# Patient Record
(demographics unavailable — no encounter records)

---

## 2025-05-22 NOTE — HISTORY OF PRESENT ILLNESS
[8] : 8 [Localized] : localized [Intermittent] : intermittent [Household chores] : household chores [Rest] : rest [Injection therapy] : injection therapy [Walking] : walking [Stairs] : stairs [de-identified] : 02/08/24 follow up bl knees finsihed series of euflexxa in march 2023 with good relief   03/14/2024  follow up here to start   knees orthovisc   03/21/2024- B/L Orthovisc #2.  5.22.25 PATIENT HERE FOR BILATERAL KNEE PAIN. PATIENT IS REQUESTING CORTISONE INJECTION.  [FreeTextEntry1] : bilateral knees

## 2025-05-22 NOTE — ASSESSMENT
[FreeTextEntry1] : 66 year F WITH MODERATE B/L KNEE PAIN, L>R. HAS SEEN DR. GOMEZ IN THE PAST AND TRIED CSI AND GEL INJECTIONS WITH SOME RELIEF. PAIN WORSENS WITH STAIRS AND WALKING PROLONGED DISTANCES. PAIN IS AFFECTING ADL AND FUNCTIONAL ACTIVITIES. XRAYS REVIEWED WITH LT KNEE ADVANCED MEDIAL OA, RT KNEE MILD OA. TREATMENT OPTIONS REVIEWED. QUESTIONS ANSWERED. TKA DISCUSSED, SHE WOULD LIKE TO WAIT UNTIL JULY.   PMHx: DM (A1C: 6.4) NO METAL ALLERGIES NO H/O DVT/PE NO H/O MRSA/VRSA   WE WILL GIVE CORTISONE TODAY. TIMING REVIEWED FU WITH AUTH FOR EUFLEXXA

## 2025-05-22 NOTE — PROCEDURE
[de-identified] : Procedure Name: Large Joint Injection / Aspiration: Depomedrol and Marcaine Anesthesia: ethyl chloride sprayed topically..  Depomedrol: An injection of Depomedrol 80 mg , 1 cc.  Marcaine: 5 cc.  Medication was injected in LEFT knees. Patient has tried OTC's including aspirin, Ibuprofen, Aleve etc or prescriptionNSAIDS, and/or exercises at home and/ or physical therapy without satisfactory response. After verbal consent using sterile preparation and technique. The risks, benefits, and alternatives to cortisone injection were explained in full to the patient. Risks outlined include but are not limited to infection, sepsis, bleeding, scarring, skin discoloration, temporary  increase in pain, syncopal episode, failure to resolve symptoms, allergic reaction, symptom recurrence, and elevation of blood sugar in diabetics. Patient understood the risks. All questions were answered. After discussion of options, patient requested an injection. Oral informed consent was obtained and sterile prep was done of the injection site. Sterile technique was utilized for the procedure including the preparation of the solutions used for the injection. Patient tolerated the procedure well. Advised to ice the injection site this evening. Prep with alcohol locally to site. Sterile technique used. Post Procedure Instructions: Patient was advised to call if redness, pain, or fever occur and apply ice for 15 min. out of every hour for the next 12-24 hours as tolerated.

## 2025-06-12 NOTE — ASSESSMENT
[FreeTextEntry1] : 67 year F WITH MODERATE B/L KNEE PAIN, L>R. HAS SEEN DR. GOMEZ IN THE PAST AND TRIED CSI AND GEL INJECTIONS WITH SOME RELIEF. PAIN WORSENS WITH STAIRS AND WALKING PROLONGED DISTANCES. PAIN IS AFFECTING ADL AND FUNCTIONAL ACTIVITIES. XRAYS REVIEWED WITH LT KNEE ADVANCED MEDIAL OA, RT KNEE MILD OA. TREATMENT OPTIONS REVIEWED. QUESTIONS ANSWERED. TKA DISCUSSED, SHE WOULD LIKE TO WAIT UNTIL JULY.   PMHx: DM (A1C: 6.4) NO METAL ALLERGIES NO H/O DVT/PE NO H/O MRSA/VRSA   B/L KNEE EUFLEXXA #2 TODAY. PATIENT TOLERATED INJECTION WELL.

## 2025-06-12 NOTE — HISTORY OF PRESENT ILLNESS
[8] : 8 [Localized] : localized [Intermittent] : intermittent [Household chores] : household chores [Rest] : rest [Injection therapy] : injection therapy [Walking] : walking [Stairs] : stairs [de-identified] : 02/08/24 follow up bl knees finsihed series of euflexxa in march 2023 with good relief   03/14/2024  follow up here to start  bl knees orthovisc   03/21/2024- B/L Orthovisc #2.  5.22.25 PATIENT HERE FOR BILATERAL KNEE PAIN. PATIENT IS REQUESTING CORTISONE INJECTION.   6.05.25: PATIENT HERE FOR BILATERAL KNEE PAIN. EUFLEXXA INJ #1 TODAY, BILATERAL KNEES.  6.12.25 PATIENT HERE FOR BILATERAL KNEE PAIN. EUFLEXXA INJ #2 TODAY, BILATERAL KNEES.  [FreeTextEntry1] : bilateral knees

## 2025-06-12 NOTE — PROCEDURE
[de-identified] : Procedure Name: Euflexxa (Large Joint)  Viscosupplementation Injection: X-ray evidence of Osteoarthritis on this or prior visit, Patient has tried OTC's including aspirin, Ibuprofen, Aleve etc or prescription NSAIDS, and/or exercises at home and/ or physical therapy without satisfactory response and Repeat series performed because patient had significant improvement in their pain and functional capacity from prior series which was given more than six months ago.   An injection of Euflexxa 2ml #2 was injected into the bilateral knee(s). after verbal consent using sterile technique. The risks, benefits, and alternatives to Viscosupplementation injection were explained in full to the patient. Risks outlined include but are not limited to infection, sepsis, bleeding, scarring, skin discoloration, temporary increase in pain, syncopal episode, failure to resolve symptoms, allergic reaction, and symptom recurrence. Signs and symptoms of infection reviewed and patient advised to call immediately for redness, fevers, and/or chills. Patient understood the risks. All questions were answered. After discussion of options, patient requested Viscosupplementation. The patient tolerated the procedure well. Ice tonight to the injection site.

## 2025-06-19 NOTE — HISTORY OF PRESENT ILLNESS
[8] : 8 [Localized] : localized [Intermittent] : intermittent [Household chores] : household chores [Rest] : rest [Injection therapy] : injection therapy [Walking] : walking [Stairs] : stairs [de-identified] : 02/08/24 follow up bl knees finsihed series of euflexxa in march 2023 with good relief   03/14/2024  follow up here to start  bl knees orthovisc   03/21/2024- B/L Orthovisc #2.  5.22.25 PATIENT HERE FOR BILATERAL KNEE PAIN. PATIENT IS REQUESTING CORTISONE INJECTION.   6.05.25: PATIENT HERE FOR BILATERAL KNEE PAIN. EUFLEXXA INJ #1 TODAY, BILATERAL KNEES.  6.12.25 PATIENT HERE FOR BILATERAL KNEE PAIN. EUFLEXXA INJ #2 TODAY, BILATERAL KNEES.   6.19.25: PATIENT HERE FOR BILATERAL KNEE PAIN. EUFLEXXA INJ #3 TODAY, BILATERAL KNEES.  [FreeTextEntry1] : bilateral knees

## 2025-06-19 NOTE — PROCEDURE
[de-identified] : Procedure Name: Euflexxa (Large Joint)  Viscosupplementation Injection: X-ray evidence of Osteoarthritis on this or prior visit, Patient has tried OTC's including aspirin, Ibuprofen, Aleve etc or prescription NSAIDS, and/or exercises at home and/ or physical therapy without satisfactory response and Repeat series performed because patient had significant improvement in their pain and functional capacity from prior series which was given more than six months ago.   An injection of Euflexxa 2ml #3 was injected into the bilateral knee(s). after verbal consent using sterile technique. The risks, benefits, and alternatives to Viscosupplementation injection were explained in full to the patient. Risks outlined include but are not limited to infection, sepsis, bleeding, scarring, skin discoloration, temporary increase in pain, syncopal episode, failure to resolve symptoms, allergic reaction, and symptom recurrence. Signs and symptoms of infection reviewed and patient advised to call immediately for redness, fevers, and/or chills. Patient understood the risks. All questions were answered. After discussion of options, patient requested Viscosupplementation. The patient tolerated the procedure well. Ice tonight to the injection site.

## 2025-06-19 NOTE — ASSESSMENT
[FreeTextEntry1] : 67 year F WITH MODERATE B/L KNEE PAIN, L>R. HAS SEEN DR. GOMEZ IN THE PAST AND TRIED CSI AND GEL INJECTIONS WITH SOME RELIEF. PAIN WORSENS WITH STAIRS AND WALKING PROLONGED DISTANCES. PAIN IS AFFECTING ADL AND FUNCTIONAL ACTIVITIES. XRAYS REVIEWED WITH LT KNEE ADVANCED MEDIAL OA, RT KNEE MILD OA. TREATMENT OPTIONS REVIEWED. QUESTIONS ANSWERED. TKA DISCUSSED, SHE WOULD LIKE TO WAIT UNTIL JULY.   PMHx: DM (A1C: 6.4) NO METAL ALLERGIES NO H/O DVT/PE NO H/O MRSA/VRSA   B/L KNEE EUFLEXXA #3 TODAY. PATIENT TOLERATED INJECTION WELL.